# Patient Record
Sex: FEMALE | Race: WHITE | URBAN - METROPOLITAN AREA
[De-identification: names, ages, dates, MRNs, and addresses within clinical notes are randomized per-mention and may not be internally consistent; named-entity substitution may affect disease eponyms.]

---

## 2018-04-02 ENCOUNTER — COMMUNICATION - HEALTHEAST (OUTPATIENT)
Dept: SURGERY | Facility: CLINIC | Age: 38
End: 2018-04-02

## 2018-05-01 ENCOUNTER — OFFICE VISIT - HEALTHEAST (OUTPATIENT)
Dept: SURGERY | Facility: CLINIC | Age: 38
End: 2018-05-01

## 2018-05-01 DIAGNOSIS — G47.30 SLEEP APNEA: ICD-10-CM

## 2018-05-01 DIAGNOSIS — M54.30 SCIATICA: ICD-10-CM

## 2018-05-01 DIAGNOSIS — F32.A ANXIETY AND DEPRESSION: ICD-10-CM

## 2018-05-01 DIAGNOSIS — M79.673 FOOT PAIN: ICD-10-CM

## 2018-05-01 DIAGNOSIS — F41.9 ANXIETY AND DEPRESSION: ICD-10-CM

## 2018-05-01 DIAGNOSIS — N94.6 DYSMENORRHEA: ICD-10-CM

## 2018-05-01 DIAGNOSIS — K90.0 CELIAC DISEASE: ICD-10-CM

## 2018-05-01 DIAGNOSIS — N92.0 MENORRHAGIA: ICD-10-CM

## 2018-05-01 DIAGNOSIS — M25.569 KNEE PAIN: ICD-10-CM

## 2018-05-01 DIAGNOSIS — R32 URINARY INCONTINENCE: ICD-10-CM

## 2018-05-01 DIAGNOSIS — E66.9 OBESITY (BMI 30-39.9): ICD-10-CM

## 2018-05-01 RX ORDER — CITALOPRAM HYDROBROMIDE 40 MG/1
40 TABLET ORAL DAILY
Refills: 4 | Status: SHIPPED | COMMUNITY
Start: 2018-03-28

## 2018-05-01 RX ORDER — VALACYCLOVIR HYDROCHLORIDE 500 MG/1
500 TABLET, FILM COATED ORAL 2 TIMES DAILY PRN
Status: SHIPPED | COMMUNITY
Start: 2018-05-01

## 2018-05-01 RX ORDER — FLUCONAZOLE 150 MG/1
150 TABLET ORAL
Status: SHIPPED | COMMUNITY
Start: 2018-05-01

## 2018-05-01 ASSESSMENT — MIFFLIN-ST. JEOR: SCORE: 1549.25

## 2018-06-14 ENCOUNTER — COMMUNICATION - HEALTHEAST (OUTPATIENT)
Dept: SURGERY | Facility: CLINIC | Age: 38
End: 2018-06-14

## 2018-07-18 ENCOUNTER — COMMUNICATION - HEALTHEAST (OUTPATIENT)
Dept: SURGERY | Facility: CLINIC | Age: 38
End: 2018-07-18

## 2018-09-25 ENCOUNTER — RECORDS - HEALTHEAST (OUTPATIENT)
Dept: LAB | Facility: CLINIC | Age: 38
End: 2018-09-25

## 2018-09-28 LAB — BACTERIA SPEC CULT: NORMAL

## 2019-12-31 ENCOUNTER — RECORDS - HEALTHEAST (OUTPATIENT)
Dept: LAB | Facility: CLINIC | Age: 39
End: 2019-12-31

## 2019-12-31 LAB
CHOLEST SERPL-MCNC: 173 MG/DL
FASTING STATUS PATIENT QL REPORTED: ABNORMAL
HDLC SERPL-MCNC: 48 MG/DL
LDLC SERPL CALC-MCNC: 101 MG/DL
TRIGL SERPL-MCNC: 120 MG/DL

## 2020-01-02 LAB
HPV SOURCE: NORMAL
HUMAN PAPILLOMA VIRUS 16 DNA: NEGATIVE
HUMAN PAPILLOMA VIRUS 18 DNA: NEGATIVE
HUMAN PAPILLOMA VIRUS FINAL DIAGNOSIS: NORMAL
HUMAN PAPILLOMA VIRUS OTHER HR: NEGATIVE
SPECIMEN DESCRIPTION: NORMAL

## 2020-01-07 ENCOUNTER — RECORDS - HEALTHEAST (OUTPATIENT)
Dept: LAB | Facility: CLINIC | Age: 40
End: 2020-01-07

## 2020-01-08 LAB — RHEUMATOID FACT SERPL-ACNC: <15 IU/ML (ref 0–30)

## 2020-01-09 LAB
25(OH)D3 SERPL-MCNC: 18.3 NG/ML (ref 30–80)
B BURGDOR IGG+IGM SER QL: 0.09 INDEX VALUE

## 2020-01-31 ENCOUNTER — RECORDS - HEALTHEAST (OUTPATIENT)
Dept: LAB | Facility: CLINIC | Age: 40
End: 2020-01-31

## 2020-02-03 LAB — BACTERIA SPEC CULT: NORMAL

## 2020-04-07 ENCOUNTER — RECORDS - HEALTHEAST (OUTPATIENT)
Dept: LAB | Facility: CLINIC | Age: 40
End: 2020-04-07

## 2020-04-07 LAB
ALBUMIN SERPL-MCNC: 3.8 G/DL (ref 3.5–5)
ALP SERPL-CCNC: 91 U/L (ref 45–120)
ALT SERPL W P-5'-P-CCNC: 70 U/L (ref 0–45)
ANION GAP SERPL CALCULATED.3IONS-SCNC: 11 MMOL/L (ref 5–18)
AST SERPL W P-5'-P-CCNC: 25 U/L (ref 0–40)
BILIRUB SERPL-MCNC: 0.3 MG/DL (ref 0–1)
BUN SERPL-MCNC: 8 MG/DL (ref 8–22)
CALCIUM SERPL-MCNC: 9.4 MG/DL (ref 8.5–10.5)
CHLORIDE BLD-SCNC: 103 MMOL/L (ref 98–107)
CHOLEST SERPL-MCNC: 197 MG/DL
CO2 SERPL-SCNC: 25 MMOL/L (ref 22–31)
CREAT SERPL-MCNC: 0.77 MG/DL (ref 0.6–1.1)
FASTING STATUS PATIENT QL REPORTED: ABNORMAL
GFR SERPL CREATININE-BSD FRML MDRD: >60 ML/MIN/1.73M2
GLUCOSE BLD-MCNC: 97 MG/DL (ref 70–125)
HDLC SERPL-MCNC: 40 MG/DL
LDLC SERPL CALC-MCNC: 111 MG/DL
LDLC SERPL CALC-MCNC: ABNORMAL MG/DL
LIPASE SERPL-CCNC: 25 U/L (ref 0–52)
POTASSIUM BLD-SCNC: 4.6 MMOL/L (ref 3.5–5)
PROT SERPL-MCNC: 6.9 G/DL (ref 6–8)
SODIUM SERPL-SCNC: 139 MMOL/L (ref 136–145)
TRIGL SERPL-MCNC: 530 MG/DL

## 2020-04-14 ENCOUNTER — AMBULATORY - HEALTHEAST (OUTPATIENT)
Dept: SCHEDULING | Facility: CLINIC | Age: 40
End: 2020-04-14

## 2020-04-14 DIAGNOSIS — K76.0 HEPATIC STEATOSIS: ICD-10-CM

## 2020-04-14 DIAGNOSIS — E66.01 MORBID OBESITY (H): ICD-10-CM

## 2020-05-13 ENCOUNTER — RECORDS - HEALTHEAST (OUTPATIENT)
Dept: LAB | Facility: CLINIC | Age: 40
End: 2020-05-13

## 2020-05-13 LAB
ERYTHROCYTE [SEDIMENTATION RATE] IN BLOOD BY WESTERGREN METHOD: 8 MM/HR (ref 0–20)
RHEUMATOID FACT SERPL-ACNC: <15 IU/ML (ref 0–30)
URATE SERPL-MCNC: 4.5 MG/DL (ref 2–7.5)

## 2020-05-15 LAB — B BURGDOR IGG+IGM SER QL: 0.08 INDEX VALUE

## 2021-05-30 ENCOUNTER — RECORDS - HEALTHEAST (OUTPATIENT)
Dept: ADMINISTRATIVE | Facility: CLINIC | Age: 41
End: 2021-05-30

## 2021-05-31 ENCOUNTER — RECORDS - HEALTHEAST (OUTPATIENT)
Dept: ADMINISTRATIVE | Facility: CLINIC | Age: 41
End: 2021-05-31

## 2021-06-01 VITALS — BODY MASS INDEX: 34.15 KG/M2 | HEIGHT: 64 IN | WEIGHT: 200 LBS

## 2021-06-17 NOTE — PROGRESS NOTES
Patient has a goal of getting her weight down to 130 lbs.  She stepped on the scale a month ago and her weight was 217 lb which was a wake up call for her.  She started Weight Watchers and quit drinking 5-6 Mt. Dew per day.  She was started on the CPAP in 2006 and has been very non-compliant with it.  She has another sleep study on May 11th and she is pretty sure she still needs it.  Patient here for consult regarding non-surgical weight loss. Initial labs ordered today and patient instructed to do as soon as possible.  Helped to set up future appointments.  Measurements and photos also taken today.  Patient verbalized understanding at this time without any questions.      Lisa German RN, CBN  [facility]  [phone]

## 2021-06-26 NOTE — PROGRESS NOTES
Progress Notes by Garima Garrido MD at 5/1/2018 10:30 AM     Author: Garima Garrido MD Service: -- Author Type: Physician    Filed: 5/1/2018 12:26 PM Encounter Date: 5/1/2018 Status: Signed    : Garima Garrido MD (Physician)       BARIATRIC CONSULTATION    Impression: Denise Keith is a 37 y.o. year old female with  has a past medical history of Anxiety and depression; Celiac disease; Depression; Dysmenorrhea; Foot pain; Knee pain; Menorrhagia; Obesity (BMI 30-39.9); Sciatica; Sleep apnea; and Urinary incontinence.  Poor functional capacity and musculoskeletal disability in the setting of the abovementioned weight related co-morbidities. Her Body mass index is 34.87 kg/(m^2)..    Plan: Dietitian, strength training to maintain muscle mass. Continue to quantify steps and maintain 8,000-10,000  We discussed [facility] Bariatric Basics including:  -eating 3 meals daily  -eating protein first  -eating slowly, chewing food well  -avoiding/limiting calorie containing beverages  -choosing wheat, not white with breads, crackers, pastas, kenia, bagels, tortillas, rice  -limiting restaurant or cafeteria eating to twice a week or less    We discussed the importance of restorative sleep and stress management in maintaining a healthy weight.    We reviewed medications associated with weight gain.    We discussed insulin resistance and glycemic index as it relates to appetite and weight control.     We discussed the National Weight Control Registry healthy weight maintenance strategies and ways to optimize metabolism.  We discussed the importance of physical activity including cardiovascular and strength training in maintaining a healthier weight and explored viable options.    We discussed medications available for weight loss including Phentermine, Phendimetrazine, Topamax, Qsymia, Lorcaserin, Diethylproprion, Orlistat, Contrave, Saxenda, and Vyvanse. We discussed the risks and benefits of each. We  "discussed indications, contraindications, potential side effects, and estimated costs of each. Literature was offered.  60 minutes spent with patient. >50% in counseling.      History Surrounding Consultation  Struggles with weight started at age 22  Her weight at age 17 was 115#  She has had several past supervised and unsupervised weight loss attempts  The most weight lost was: 17#  It was a durable weight maintenance as she lost it the month before coming here.  History of bulimia, anorexia, or binge eating disorder? no  If Present has eating disorder been in remission at least 3 years? NA  Night time eating? no    Dietary History  Meals per day: B: premier protein with hard boiled egg fruit, L: chicken vegetable S: fruit or veggie D: chicken and rice  Snacks: yes  Typical Snack: grapes, banana  Who does the grocery shopping? She does  Who does the cooking? She does  A typical meal includes: chicken 1/2 c white rice, asparagus  Regular Pop: just stopped 6-8 cans of Mt. Dew daily  Juice: none  Caffeine: none  Amount of restaurant eating per week: now 0-1, was more  Eating a the table with the TV off? yes    Physical Activity Patterns  Current physical activity routine includes: was none now walking 1/2 hour daily.  Walks to work. -7-8000 steps daily    Limitations from being physically active on a regular basis includes: knee pain and embarrassment    She describes her general health as: fair    Past Medical History  HTN: no  Dyslipidemia: no  COLIN: yes-will be re-starting CPAP  Obesity Hypoventilation: NO  DM2: no DM1: NA DX: NA Most recent AIC: NA  Neuropathy: sciatica  Nephropathy: no  Retinopathy: no  IFG or \"pre-DM\": no  MI: no  CVA:no  CHF: no  Heart Valves: no  Previous cardiac testing includes: EKG normal for panic remote  Cancers: no  Kidney Disease: no  DVT: no  PE: no  Colitis: no  Crohn's: no  IBS: no  PUD: no  Fatty Liver: no  Abnormal LFTs: no  Hepatitis: no  Asthma: no  Bronchitis: remote  Pneumonia: " remote  Other Lung Problems: no  Back Pain:occasionally  DDD: no  Gout: no  Fibromyalgia: no  USI: yes  Severe Headaches: one migraine  Seizures: no If so, last seizure: no  Pseudotumor: no  PCOS: no  Menstrual Irregularity: no  Menorrhagia: yes  Infertility: no  Thyroid problems: no  Thyroid medications: no  Glaucoma: no  HIV positive: NO  MRSA/VRE history: no  History of Blood transfusion: no  Anemia: no    Health Care Maintenance  Colonoscopy: NA  Mammogram: NA  Pap: UTD    Medications   Current Outpatient Prescriptions   Medication Sig Dispense Refill   ? citalopram (CELEXA) 20 MG tablet Take 2 tablets by mouth daily.  4   ? valACYclovir (VALTREX) 500 MG tablet Take 500 mg by mouth 2 (two) times a day as needed.     ? fluconazole (DIFLUCAN) 150 MG tablet Take 150 mg by mouth once as needed.       No current facility-administered medications for this visit.      Allergies   Gluten and Miconazole  Past Surgical History  Past Surgical History:   Procedure Laterality Date   ? APPENDECTOMY     ? LAPAROSCOPIC INGUINAL HERNIA REPAIR     ? OOPHORECTOMY     ? TUBAL LIGATION       History of problems with anesthesia: no  History of Malignant Hyperthermia: NO    Gynecological History  Menarche: 11  Regular: yes  Currently: regular  Problems getting pregnant: no  MD Involvement: NA If so, explanation/Diagnosis: NA  : 3  Para:   C-S: no  Vaginal deliveries: 2  SAB:0  EAB: ectopic pregnancy  Gestational DM: borderline (10# baby)  Gestational HTN: yes  Preeclampsia: yes  Current Birth Control: TL    Family History  family history includes Cancer in her father, mother, paternal grandfather, paternal grandmother, and sister; Heart disease in her father, maternal grandfather, maternal uncle, mother, and paternal grandmother; Hyperlipidemia in her maternal uncle and mother; Hypertension in her father, maternal grandfather, mother, and paternal grandmother; Kidney failure in her maternal uncle; Obesity in her father;  "Sleep apnea in her father and mother.    Social History  Status: M  Children: 2, 19 yo daughter and 15 yo son  Work Status: FT      Addiction History  Smoking History:   Started smoking: never Quit smoking: NA Total years of tobacco use: 0  Alcohol use: rare   Current or Past history of alcohol or substance abuse: no  Last used: NA  Chemical Dependency Treatment History: NA  Chemicals: NA    Psychiatric History  Diagnoses: depression and anxiety  Treated by: primary MD  Psychiatric Hospitalizations: no  Suicide attempts: no  ECT: no  Panic attacks: no  History of Abuse: no    Palliative Medicine History  Involvement in a pain clinic: no    ROS  Sleep  Snoring: COLIN  PND: COLIN  Witnessed Apneas: COLIN  [place]: COLIN  STOP BANG: COLIN  General  Fatigue: yes  Sleep Quality:non-restorative. In bed 8 hours  HEENT  Visual changes: no  Gastrointestinal  Heartburn: no  Dysphagia: no  Cardiovascular  Murmur: no  Elevated BP: no  Chest Pain with Exertion: no  Dyspnea with Exertion: yes  Palpitations: no  Lower Extremity Edema: no  Syncope: no  Pulmonary  Shortness of breath at rest: no  Snoring: COLIN  PND: COLIN  Wheezing: COLIN  CPAP use: not yet  Gastrointestinal  Trouble swallowing:no  Heartburn: no  HX UGI/EGD: yes when diagnosed with celiac  Abdominal pain: no  Hematochezia: no  Urologic  Hesitancy: no  Urgency: no  Genitourinary  ED: NA  Menorrhagia: yes  Dysmenorrhea: yes  Neurologic  Severe headache:no  Paresthesias: sciatica  Psychiatric  Moods Stable: yes  Hallucinations: no  Rheumatologic  Myalgias: yes  Arthralgias: yes  Endocrine  Polydipsia: no  Polyuria: no  Galactorrhea: no  Heat intolerance: yes  Hirsutism: no  Musculoskeletal  Joint pain;yes  Falls: no  Use of cane, crutch or motorized scooter: no  Hematologic  Abnormal Bleeding or Clotting: no  Dermatologic  Skin Tags: no  Striae: yes  Furuncless: no  Acne: no  Intertrigo: no  Lower Leg ulcers: no      Physical Exam  Height: 5' 3.5\" (1.613 m) (5/1/2018 10:48 " "AM)  Initial Weight: 200 lbs (5/1/2018 11:00 AM)  Weight: 200 lb (90.7 kg) (5/1/2018 10:48 AM)  BMI (Calculated): 34.9 (5/1/2018 10:48 AM)  SpO2: 98 % (5/1/2018 10:48 AM)  Waist Circumference (In): 43 Inches (5/1/2018 11:00 AM)  Hip Circumference (In): 46.5 Inches (5/1/2018 11:00 AM)  Neck Circumference (In): 14.25 Inches (5/1/2018 11:00 AM)  Body fat percentage: 45.4 (5/1/2018 11:00 AM)      General Appearance  No acute distress. Obesity: central  Alert: yes  Sleepy: no  HEENT  PERRLA, EOMI  Neck  Stout: 14.5\" No carotid bruits  Airway: 3+  Cardiovascular  Rhythm regular Rate Regular  Murmur: no  Pulmonary  North Score: 12  Lungs clear to ascultation  Abdomen  No rashes.   Post surgical Scars: lap  Extremities:  Pitting edema: no  Palpable distal pulses: 2+  Varicose veins: no  Neurologic  Tremors: no  Psychiatric  Thought Content Organized  Mood appears stable  Endocrine  Moon Facies: NO  Dorsal Thoracic Prominence: NO  Skin tags: no  Acanthosis nigricans: no  Dermatologic  Intertrigo: no              Total time with patient 60 minutes, >50% in counseling and coordination of care.             "

## 2021-08-10 ENCOUNTER — LAB REQUISITION (OUTPATIENT)
Dept: LAB | Facility: CLINIC | Age: 41
End: 2021-08-10
Payer: COMMERCIAL

## 2021-08-10 DIAGNOSIS — R51.9 HEADACHE, UNSPECIFIED: ICD-10-CM

## 2021-08-10 LAB
CRP SERPL HS-MCNC: 6.4 MG/L (ref 0–3)
ERYTHROCYTE [SEDIMENTATION RATE] IN BLOOD BY WESTERGREN METHOD: 8 MM/HR (ref 0–20)

## 2021-08-10 PROCEDURE — 85652 RBC SED RATE AUTOMATED: CPT | Performed by: FAMILY MEDICINE

## 2021-08-10 PROCEDURE — 86141 C-REACTIVE PROTEIN HS: CPT | Mod: ORL | Performed by: FAMILY MEDICINE

## 2022-06-07 ENCOUNTER — LAB REQUISITION (OUTPATIENT)
Dept: LAB | Facility: CLINIC | Age: 42
End: 2022-06-07
Payer: COMMERCIAL

## 2022-06-07 DIAGNOSIS — R53.83 OTHER FATIGUE: ICD-10-CM

## 2022-06-07 PROCEDURE — 87077 CULTURE AEROBIC IDENTIFY: CPT | Mod: ORL | Performed by: NURSE PRACTITIONER

## 2022-06-10 LAB — BACTERIA SPEC CULT: ABNORMAL

## 2022-06-27 ENCOUNTER — LAB REQUISITION (OUTPATIENT)
Dept: LAB | Facility: CLINIC | Age: 42
End: 2022-06-27
Payer: COMMERCIAL

## 2022-06-27 DIAGNOSIS — J02.9 ACUTE PHARYNGITIS, UNSPECIFIED: ICD-10-CM

## 2022-06-27 PROCEDURE — 87081 CULTURE SCREEN ONLY: CPT | Mod: ORL | Performed by: NURSE PRACTITIONER

## 2022-06-29 LAB — BACTERIA SPEC CULT: NORMAL

## 2023-02-10 ENCOUNTER — LAB REQUISITION (OUTPATIENT)
Dept: LAB | Facility: CLINIC | Age: 43
End: 2023-02-10
Payer: COMMERCIAL

## 2023-02-10 DIAGNOSIS — E78.2 MIXED HYPERLIPIDEMIA: ICD-10-CM

## 2023-02-10 LAB
CHOLEST SERPL-MCNC: 158 MG/DL
HDLC SERPL-MCNC: 47 MG/DL
LDLC SERPL CALC-MCNC: 75 MG/DL
NONHDLC SERPL-MCNC: 111 MG/DL
TRIGL SERPL-MCNC: 179 MG/DL

## 2023-02-10 PROCEDURE — 80061 LIPID PANEL: CPT | Mod: ORL | Performed by: NURSE PRACTITIONER

## 2024-03-06 ENCOUNTER — LAB REQUISITION (OUTPATIENT)
Dept: LAB | Facility: CLINIC | Age: 44
End: 2024-03-06
Payer: COMMERCIAL

## 2024-03-06 DIAGNOSIS — J02.9 ACUTE PHARYNGITIS, UNSPECIFIED: ICD-10-CM

## 2024-03-06 PROCEDURE — 87081 CULTURE SCREEN ONLY: CPT | Mod: ORL | Performed by: NURSE PRACTITIONER

## 2024-03-08 LAB — BACTERIA SPEC CULT: NORMAL

## 2024-07-15 ENCOUNTER — VIRTUAL VISIT (OUTPATIENT)
Dept: PHARMACY | Facility: PHYSICIAN GROUP | Age: 44
End: 2024-07-15

## 2024-07-15 DIAGNOSIS — L93.0 LUPUS ERYTHEMATOSUS, UNSPECIFIED FORM: ICD-10-CM

## 2024-07-15 DIAGNOSIS — B49 FUNGAL INFECTION: ICD-10-CM

## 2024-07-15 DIAGNOSIS — E66.9 OBESITY WITH SERIOUS COMORBIDITY, UNSPECIFIED CLASSIFICATION, UNSPECIFIED OBESITY TYPE: Primary | ICD-10-CM

## 2024-07-15 DIAGNOSIS — F41.9 ANXIETY: ICD-10-CM

## 2024-07-15 DIAGNOSIS — B00.9 HERPES SIMPLEX VIRUS INFECTION: ICD-10-CM

## 2024-07-15 PROCEDURE — 99207 PR NO CHARGE LOS: CPT | Mod: 93 | Performed by: PHARMACIST

## 2024-07-15 RX ORDER — TOPIRAMATE 100 MG/1
100 TABLET, FILM COATED ORAL AT BEDTIME
COMMUNITY

## 2024-07-15 RX ORDER — HYDROXYCHLOROQUINE SULFATE 200 MG/1
400 TABLET, FILM COATED ORAL DAILY
COMMUNITY

## 2024-07-15 NOTE — PROGRESS NOTES
Medication Therapy Management (MTM) Encounter    ASSESSMENT:                            Medication Adherence/Access: No issues identified    Weight Management: would benefit from starting GLP1 agonist if able to get covered.  Would be ok to stay on topiramate, however she would like to stop it to minimize medications.    Mental Health/Anxiety:  stable.     Herpes simplex: stable.    Fungal infections: stable.    Lupus: stable.    PLAN:                            Start Wegovy 0.25 mg once weekly for 4 weeks then increase to 0.5 mg once weekly thereafter.  If approved, we plan to stop topiramate when we start Wegovy.  OK to stop at current dose vs taper off.    Follow-up:   Aug 12, 2024 11:00 AM  Pharmacist Visit with Vivian Dale, [facility]   [phone]       SUBJECTIVE/OBJECTIVE:                          Denise Keith is a 43 year old female seen for an initial visit. She was referred to me from Nadine Martinez.      Reason for visit: medication review, discuss weight loss medications.    Allergies/ADRs: Reviewed in chart  Past Medical History: Reviewed in chart  Tobacco: She reports that she has never smoked. She has never used smokeless tobacco.  Alcohol: none    Medication Adherence/Access: no issues reported    Weight Management:   - Topiramate 100 mg daily  Patient reports no current medication side effects.  Nutrition/Eating Habits: Am/lunch- hashbrown, eggs, perdomo, salsa with beans. Dinner- mexican food. [place] food in vegetable oil. No issues with heartburn or GI upset currently.  Doesn't like sweets   Exercise/Activity: gardens, bikes, walks dogs daily.  Gets 10,000 steps in daily. Not vigorous/ activity, but moves a lot.  Refurbishes furniture.  She would like to get into an exercise routine at home, something like weight lifting and something that increases heart rate.  Would like to come off topiramate if we start a GLP1 agonist.    Has sleep apnea, back pain/chronic pain issues, hyperlipidemia, and  history hepatic steatosis.  She was referred to [facility] bariatric clinic, saw a nutritionist, they started topiramate to help with pop cravings.  She did start drinking some pop again but much less  She has lost 30 lb on her own (she went from 220 to 190 lb), but the weight loss has plateaued.    No personal or family history of thyroid carcinoma.  No history of pancreatitis, MTC, MEN2, or gallbladder issues.  Reviewed possible side effects of lower appetite, nausea, bowel movement changes and vomiting.    Eats a lot of fried food-   Medications Tried/Failed:  - Ozempic/Saxenda- was denied, never tried either due to insurance  Home weight: 193 lb     Mental Health   Anxiety  - Citalopram 40 mg once daily  Patient reports no current medication side effects.  Feels like this has been very effective, has been on it for years.       Herpes simplex:   - Valacyclovir 2 g twice daily for flares  Rarely needs    Fungal infections:  - Fluconazole 150 mg as single dose, repeat in 5 days if not improving  Only takes when on an antibiotic    Lupus:   - Hydroxychloroquine 400 mg once daily   Was having terrible joint pain and unexplainable fevers.  They hydroxychloroquine has helped a lot with these symptoms.  Her diagnosis of Lupus is not clear.  At a second opinion they weren't sure the diagnosis of lupus was correct.  When in the sun gets lots of rashes.   Follow[facility] with Dr. Logan at [facility]  Hydroxychloroquine monitoring:   Eye exam- annually, up to date    CBC, LFT, renal function: up to date    Chronic pain:  - Medical marijuana as needed   Not discussed in detail    ----------------      I spent 25 minutes with this patient today. All changes were made via collaborative practice agreement with DESHAUN Saldivar CNP. A copy of the visit note was provided to the patient's provider(s).    A summary of these recommendations was declined by the patient.    Sindhu Montilla, ShabanaD  Medication Therapy Management  Pharmacist      Telemedicine Visit Details  Type of service:  Telephone visit  Start Time:  11 am  End Time:  11:25 am     Medication Therapy Recommendations  Obesity (BMI 30-39.9)    Current Medication: topiramate (TOPAMAX) 100 MG tablet   Rationale: Synergistic therapy - Needs additional medication therapy - Indication   Recommendation: Start Medication   Status: Accepted per CPA   Note: wegovy

## 2024-08-12 ENCOUNTER — VIRTUAL VISIT (OUTPATIENT)
Dept: PHARMACY | Facility: PHYSICIAN GROUP | Age: 44
End: 2024-08-12
Payer: COMMERCIAL

## 2024-08-12 DIAGNOSIS — E66.9 OBESITY WITH SERIOUS COMORBIDITY, UNSPECIFIED CLASSIFICATION, UNSPECIFIED OBESITY TYPE: Primary | ICD-10-CM

## 2024-08-12 PROCEDURE — 99606 MTMS BY PHARM EST 15 MIN: CPT | Mod: 93 | Performed by: PHARMACIST

## 2024-08-12 NOTE — PROGRESS NOTES
Medication Therapy Management (MTM) Encounter    ASSESSMENT:                            Medication Adherence/Access: See below    Weight Management: would benefit from starting GLP1 agonist if able to get covered. Discussed with patient that I will make sure we resubmit the prior auth to the correct insurance and follow up sooner to review results of the PA.     PLAN:                            I will resend Wegovy to [facility] and start the PA with your [employer] insurance plan  Continue topiramate in the mean time    Follow-up:   Appointments in Next Year      Aug 22, 2024 11:00 AM  Pharmacist Visit with Vivian Dale, [facility] [phone]              SUBJECTIVE/OBJECTIVE:                          Denise Keith is a 43 year old female seen for an follow up visit.     Reason for visit: wegovy follow up     Allergies/ADRs: Reviewed in chart  Past Medical History: Reviewed in chart  Tobacco: She reports that she has never smoked. She has never used smokeless tobacco.  Alcohol: none    Medication Adherence/Access: has [employer] insurance and her preferred pharmacy is [facility] on [address]  RxBIN- [id number]  RxPCN- [id number]  RxGrp [id number]   [phone]     Our last Rx went to [facility] on [address] and the PA was denied by her old [employer] insurance.     Weight Management   - Topiramate 100 mg daily  -Wegovy 0.25 mg weekly   Has not started Wegovy, has not heard on the status of the PA.   Patient reports no current medication side effects.    Nutrition/Eating Habits: Am/lunch- hashbrown, eggs, perdomo, salsa with beans. Dinner- mexican food. [place] food in vegetable oil. No issues with heartburn or GI upset currently.  Doesn't like sweets     Exercise/Activity: gardens, bikes, walks dogs daily.  Gets 10,000 steps in daily. Not vigorous/ activity, but moves a lot.  Refurbishes furniture.  She would like to get into an exercise routine at home, something like  weight lifting and something that increases heart rate.    Has sleep apnea, back pain/chronic pain issues, hyperlipidemia, and history hepatic steatosis.    She was referred to [facility] bariatric clinic, saw a nutritionist, they started topiramate to help with pop cravings.  She did start drinking some pop again but much less  She has lost 30 lb on her own (she went from 220 to 190 lb), but the weight loss has plateaued.      Eats a lot of fried food-   Medications Tried/Failed:  - Ozempic/Saxenda- was denied, never tried either due to insurance  Home weight: 193 lb          ----------------  I spent  7 minutes with this patient today. All changes were made via collaborative practice agreement with DESHAUN Saldivar CNP. A copy of the visit note was provided to the patient's provider(s).    A summary of these recommendations was declined by the patient.    Vivian Dale, Pharm.D.  Medication Therapy Management Pharmacist    Telemedicine Visit Details  Type of service:  Telephone visit  Start Time:  11:05 AM  End Time:  11:12 AM       Medication Therapy Recommendations  Obesity (BMI 30-39.9)    Current Medication: SEMAGLUTIDE-WEIGHT MANAGEMENT SC   Rationale: Cannot afford medication product - Cost - Adherence   Status: Resolved Med Access Issue

## 2024-08-22 ENCOUNTER — VIRTUAL VISIT (OUTPATIENT)
Dept: PHARMACY | Facility: PHYSICIAN GROUP | Age: 44
End: 2024-08-22
Payer: COMMERCIAL

## 2024-08-22 DIAGNOSIS — E66.9 OBESITY WITH SERIOUS COMORBIDITY, UNSPECIFIED CLASSIFICATION, UNSPECIFIED OBESITY TYPE: Primary | ICD-10-CM

## 2024-08-22 PROCEDURE — 99606 MTMS BY PHARM EST 15 MIN: CPT | Mod: 93 | Performed by: PHARMACIST

## 2024-08-22 NOTE — PROGRESS NOTES
"Medication Therapy Management (MTM) Encounter    ASSESSMENT:                            Medication Adherence/Access: See below    Weight Management: would benefit from starting GLP1 agonist if able to get covered, will have to wait for new insurance in September, could consider other medication options for weight loss, though patient prefers to think about it at this time.     PLAN:                            We will check in September if your new insurance covers GLP-1 medications   Think about other oral medications for weight loss: Naltrexone, Bupropion, Phentermine  Compounded Wegovy from [place] mail order pharmacy: Cost: $230 for 1 month for doses 1 mg and less; $370 for 1 month for doses higher than 1 mg    Follow-up: September 5th 11:00, Denise to check with [facility] on 9/1 for Weogvy coverage    SUBJECTIVE/OBJECTIVE:                          Denise Keith is a 43 year old female seen for an follow up visit.     Reason for visit: wegovy follow up     Allergies/ADRs: Reviewed in chart  Past Medical History: Reviewed in chart  Tobacco: She reports that she has never smoked. She has never used smokeless tobacco.  Alcohol: none    Medication Adherence/Access: has [employer] insurance and her preferred pharmacy is [facility] on [address] RxBIN- [id number] RxPCN- [id number] [facility] [address] ID [id number]    Weight Management   - Topiramate 100 mg daily  -Wegovy 0.25 mg weekly   Has not started Wegovy, insurance did \"cover\" it,  but her copay is $1000 per month - Wegovy savings card takes up to $225 off per month - down to $700s. Wegovy card without insurance makes it $650 per month - these are an unreasonable cost for her.   Patient reports no current medication side effects.  Nutrition/Eating Habits: Am/lunch- hashbrown, eggs, perdomo, salsa with beans. Dinner- mexican food. [place] food in vegetable oil.   Exercise/Activity: gardens, bikes, walks dogs daily.  Gets 10,000 steps in daily. Not vigorous/ activity, but moves a lot.  Refurbishes furniture.  She " would like to get into an exercise routine at home, something like weight lifting and something that increases heart rate.  She has lost 30 lb on her own (she went from 220 to 190 lb), but the weight loss has plateaued.    Medications Tried/Failed:  - Ozempic/Saxenda- was denied, never tried either due to insurance  Home weight: 193 lb        ----------------  I spent  7 minutes with this patient today. All changes were made via collaborative practice agreement with DESHAUN Saldivar CNP. A copy of the visit note was provided to the patient's provider(s).    A summary of these recommendations was declined by the patient.    Vivian Dale, Pharm.D.  Medication Therapy Management Pharmacist    Telemedicine Visit Details  Type of service:  Telephone visit  Start Time:  11:05 AM  End Time:  11:12 AM       Medication Therapy Recommendations  No medication therapy recommendations to display

## 2024-09-05 ENCOUNTER — VIRTUAL VISIT (OUTPATIENT)
Dept: PHARMACY | Facility: PHYSICIAN GROUP | Age: 44
End: 2024-09-05
Payer: COMMERCIAL

## 2024-09-05 DIAGNOSIS — E66.9 OBESITY WITH SERIOUS COMORBIDITY, UNSPECIFIED CLASSIFICATION, UNSPECIFIED OBESITY TYPE: Primary | ICD-10-CM

## 2024-09-05 DIAGNOSIS — B49 FUNGAL INFECTION: ICD-10-CM

## 2024-09-05 DIAGNOSIS — F41.9 ANXIETY: ICD-10-CM

## 2024-09-05 DIAGNOSIS — B00.9 HERPES SIMPLEX VIRUS INFECTION: ICD-10-CM

## 2024-09-05 DIAGNOSIS — L93.0 LUPUS ERYTHEMATOSUS, UNSPECIFIED FORM: ICD-10-CM

## 2024-09-05 PROCEDURE — 99606 MTMS BY PHARM EST 15 MIN: CPT | Mod: 93 | Performed by: PHARMACIST

## 2024-09-05 NOTE — PROGRESS NOTES
Medication Therapy Management (MTM) Encounter    ASSESSMENT:                            Medication Adherence/Access: See below    Weight Management: would benefit from starting GLP1 agonist if able to get covered, will  send Rx today and see about need for PA, could consider other medication options for weight loss, though patient prefers to think about it at this time.     Anxiety  stable     Herpes simplex: stable    Fungal infections:  stable    Lupus:   Managed by rheumatology     PLAN:                            [facility] [address]- send me a message if covered or not  Think about other oral medications for weight loss: Naltrexone, Bupropion, Phentermine    Follow-up: Denise to schedule in 1 month to check in on GLP-1 coverage/oral medications, will send me a message if something is needed sooner.     SUBJECTIVE/OBJECTIVE:                          Denise Keith is a 43 year old female seen for an follow up visit.     Reason for visit: wegovy follow up     Allergies/ADRs: Reviewed in chart  Past Medical History: Reviewed in chart  Tobacco: She reports that she has never smoked. She has never used smokeless tobacco.  Alcohol: none    Medication Adherence/Access: has [employer] insurance and [facility] [address]    Weight Management   - Topiramate 100 mg daily  -Wegovy 0.25 mg weekly   Has not started Wegovy, hoping that new insurance is more likely to cover GLP-1  Patient reports no current medication side effects.  Nutrition/Eating Habits: Am/lunch- hashbrown, eggs, perdomo, salsa with beans. Dinner- mexican food. [place] food in vegetable oil.   Exercise/Activity: gardens, bikes, walks dogs daily.  Gets 10,000 steps in daily. Not vigorous/ activity, but moves a lot.  Refurbishes furniture.  Working on exercise routine  She has lost 30 lb on her own (she went from 220 to 190 lb), but the weight loss has plateaued. Not wanting to talk about other oral medications yet   Medications  Tried/Failed:  - Ozempic/Saxenda- was denied, never tried either due to insurance  Home weight: 191 lb         Anxiety  - Citalopram 40 mg once daily  Patient reports no current medication side effects.  Feels like this has been very effective, has been on it for years.       Herpes simplex:   - Valacyclovir 2 g twice daily for flares  Rarely needs- last was last year    Fungal infections:  - Fluconazole 150 mg as single dose, repeat in 5 days if not improving  Only takes when on an antibiotic    Lupus:   - Hydroxychloroquine 400 mg once daily   Follows with Dr. Logan at [facility]- no changes recently, feels like this helps for lupus symptoms  Hydroxychloroquine monitoring:   Eye exam- annually, up to date    CBC, LFT, renal function: up to date    ----------------  I spent  7 minutes with this patient today. All changes were made via collaborative practice agreement with DESHAUN Saldivar CNP. A copy of the visit note was provided to the patient's provider(s).    A summary of these recommendations was declined by the patient.    Vivian Dale, Pharm.D.  Medication Therapy Management Pharmacist    Telemedicine Visit Details  Type of service:  Telephone visit  Start Time: 4:05 PM  End Time: 4:12 PM        Medication Therapy Recommendations  No medication therapy recommendations to display

## 2024-10-15 ENCOUNTER — LAB REQUISITION (OUTPATIENT)
Dept: LAB | Facility: CLINIC | Age: 44
End: 2024-10-15

## 2024-10-15 DIAGNOSIS — R53.83 OTHER FATIGUE: ICD-10-CM

## 2024-10-15 LAB
ERYTHROCYTE [DISTWIDTH] IN BLOOD BY AUTOMATED COUNT: 13 % (ref 10–15)
HCT VFR BLD AUTO: 40.6 % (ref 35–47)
HGB BLD-MCNC: 12.7 G/DL (ref 11.7–15.7)
MCH RBC QN AUTO: 27.4 PG (ref 26.5–33)
MCHC RBC AUTO-ENTMCNC: 31.3 G/DL (ref 31.5–36.5)
MCV RBC AUTO: 88 FL (ref 78–100)
PLATELET # BLD AUTO: 331 10E3/UL (ref 150–450)
RBC # BLD AUTO: 4.64 10E6/UL (ref 3.8–5.2)
WBC # BLD AUTO: 8.9 10E3/UL (ref 4–11)

## 2024-10-15 PROCEDURE — 84443 ASSAY THYROID STIM HORMONE: CPT | Performed by: FAMILY MEDICINE

## 2024-10-15 PROCEDURE — 85027 COMPLETE CBC AUTOMATED: CPT | Performed by: FAMILY MEDICINE

## 2024-10-15 PROCEDURE — 84295 ASSAY OF SERUM SODIUM: CPT | Performed by: FAMILY MEDICINE

## 2024-10-16 LAB
ALBUMIN SERPL BCG-MCNC: 3.9 G/DL (ref 3.5–5.2)
ALP SERPL-CCNC: 77 U/L (ref 40–150)
ALT SERPL W P-5'-P-CCNC: 27 U/L (ref 0–50)
ANION GAP SERPL CALCULATED.3IONS-SCNC: 11 MMOL/L (ref 7–15)
AST SERPL W P-5'-P-CCNC: 19 U/L (ref 0–45)
BILIRUB SERPL-MCNC: 0.2 MG/DL
BUN SERPL-MCNC: 6.9 MG/DL (ref 6–20)
CALCIUM SERPL-MCNC: 9.1 MG/DL (ref 8.8–10.4)
CHLORIDE SERPL-SCNC: 102 MMOL/L (ref 98–107)
CREAT SERPL-MCNC: 0.79 MG/DL (ref 0.51–0.95)
EGFRCR SERPLBLD CKD-EPI 2021: >90 ML/MIN/1.73M2
GLUCOSE SERPL-MCNC: 110 MG/DL (ref 70–99)
HCO3 SERPL-SCNC: 25 MMOL/L (ref 22–29)
POTASSIUM SERPL-SCNC: 3.8 MMOL/L (ref 3.4–5.3)
PROT SERPL-MCNC: 6.3 G/DL (ref 6.4–8.3)
SODIUM SERPL-SCNC: 138 MMOL/L (ref 135–145)
TSH SERPL DL<=0.005 MIU/L-ACNC: 3.12 UIU/ML (ref 0.3–4.2)

## 2025-01-15 ENCOUNTER — TELEPHONE (OUTPATIENT)
Dept: PHARMACY | Facility: PHYSICIAN GROUP | Age: 45
End: 2025-01-15

## 2025-01-15 NOTE — TELEPHONE ENCOUNTER
To Whom It May Concern,    I am writing on behalf of my patient, Denise Keith, to document the medical necessity of Zepbound for the treatment of moderate to severe Obstructive Sleep Apnea (COLIN) in setting of obesity. This letter provides information about the patient s medical history and diagnosis and a statement summarizing my treatment rationale.    Summary of Patient History and Diagnosis    Denise Keith is a 44 year old female with a diagnosis of moderate to severe Obstructive Sleep Apnea (COLIN), confirmed by sleep study. The patient experiences excessive daytime sleepiness, loud snoring, or other relevant symptoms]. Despite prior attempts at management, including CPAP since 2006, [employer], the condition remains inadequately controlled, significantly impacting their quality of life and overall health.    BMI 36.3    Treatment Rationale    The recent FDA approval of Zepbound for Obstructive Sleep Apnea provides a new, evidence-based therapeutic option for addressing this condition. Clinical trials, including the [facility]-COLIN study, have demonstrated Zepbound s efficacy in reducing airway obstruction by promoting significant weight loss and improving upper airway function--key factors in the pathophysiology of COLIN.    The [facility]-COLIN study specifically evaluated the impact of Zepbound on individuals with COLIN and excess weight. The study found that patients receiving Zepbound experienced a significant reduction in the Apnea-Hypopnea Index (AHI), indicating an improvement in COLIN severity. Additionally, participants reported enhanced sleep quality, reduced daytime sleepiness, and improvements in associated weight-related comorbidities such as hypertension and insulin resistance. These findings highlight Zepbound as a transformative option in COLIN management, especially for patients where weight loss plays a critical role.    Zepbound s mechanism as a dual GIP/GLP-1 receptor agonist addresses the root  causes of COLIN in individuals with excess weight, offering a comprehensive approach to management. Its ability to improve both respiratory and metabolic outcomes makes it a pivotal therapy for my patient.    Denying coverage for Zepbound not only limits the patient s ability to access a critical treatment but also contradicts evidence-based medical practices. Providing coverage for Zepbound will empower my patient to achieve better health outcomes and reduce the long-term healthcare burden associated with untreated or poorly managed COLIN.    Duration    12 months    Summary    In summary, Zepbound is medically necessary for this patient s moderate to severe Obstructive Sleep Apnea with obesity. Please call my office at [phone]  if I can provide you with any additional information to approve my request. I look forward to receiving your timely response and approval of this request.